# Patient Record
Sex: MALE | Race: BLACK OR AFRICAN AMERICAN
[De-identification: names, ages, dates, MRNs, and addresses within clinical notes are randomized per-mention and may not be internally consistent; named-entity substitution may affect disease eponyms.]

---

## 2018-03-20 ENCOUNTER — HOSPITAL ENCOUNTER (INPATIENT)
Dept: HOSPITAL 26 - MED | Age: 68
LOS: 3 days | Discharge: HOME HEALTH | DRG: 871 | End: 2018-03-23
Payer: COMMERCIAL

## 2018-03-20 VITALS — DIASTOLIC BLOOD PRESSURE: 101 MMHG | SYSTOLIC BLOOD PRESSURE: 125 MMHG

## 2018-03-20 VITALS — WEIGHT: 227.03 LBS | HEIGHT: 72 IN | BODY MASS INDEX: 30.75 KG/M2

## 2018-03-20 VITALS — SYSTOLIC BLOOD PRESSURE: 160 MMHG | DIASTOLIC BLOOD PRESSURE: 87 MMHG

## 2018-03-20 VITALS — SYSTOLIC BLOOD PRESSURE: 130 MMHG | DIASTOLIC BLOOD PRESSURE: 69 MMHG

## 2018-03-20 VITALS — DIASTOLIC BLOOD PRESSURE: 78 MMHG | SYSTOLIC BLOOD PRESSURE: 138 MMHG

## 2018-03-20 DIAGNOSIS — H91.90: ICD-10-CM

## 2018-03-20 DIAGNOSIS — N17.0: ICD-10-CM

## 2018-03-20 DIAGNOSIS — Z82.49: ICD-10-CM

## 2018-03-20 DIAGNOSIS — I10: ICD-10-CM

## 2018-03-20 DIAGNOSIS — G89.29: ICD-10-CM

## 2018-03-20 DIAGNOSIS — B19.20: ICD-10-CM

## 2018-03-20 DIAGNOSIS — E86.0: ICD-10-CM

## 2018-03-20 DIAGNOSIS — Z86.19: ICD-10-CM

## 2018-03-20 DIAGNOSIS — M54.9: ICD-10-CM

## 2018-03-20 DIAGNOSIS — F12.90: ICD-10-CM

## 2018-03-20 DIAGNOSIS — Z86.73: ICD-10-CM

## 2018-03-20 DIAGNOSIS — E03.9: ICD-10-CM

## 2018-03-20 DIAGNOSIS — T40.2X1A: ICD-10-CM

## 2018-03-20 DIAGNOSIS — E87.6: ICD-10-CM

## 2018-03-20 DIAGNOSIS — J69.0: ICD-10-CM

## 2018-03-20 DIAGNOSIS — E83.39: ICD-10-CM

## 2018-03-20 DIAGNOSIS — E11.9: ICD-10-CM

## 2018-03-20 DIAGNOSIS — N39.0: ICD-10-CM

## 2018-03-20 DIAGNOSIS — A41.9: Primary | ICD-10-CM
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
## 2018-03-20 DIAGNOSIS — F32.9: ICD-10-CM

## 2018-03-20 DIAGNOSIS — G92: ICD-10-CM

## 2018-03-20 DIAGNOSIS — F43.10: ICD-10-CM

## 2018-03-20 LAB
ALBUMIN FLD-MCNC: 3.6 G/DL (ref 3.4–5)
AMORPH SED URNS QL MICRO: (no result) /HPF
AMYLASE SERPL-CCNC: 120 U/L (ref 25–115)
ANION GAP SERPL CALCULATED.3IONS-SCNC: 14.9 MMOL/L (ref 8–16)
APAP SERPL-MCNC: < 0.5 UG/ML (ref 10–30)
APPEARANCE UR: (no result)
AST SERPL-CCNC: 18 U/L (ref 15–37)
BARBITURATES UR QL SCN: (no result) NG/ML
BASOPHILS # BLD AUTO: 0.1 K/UL (ref 0–0.22)
BASOPHILS NFR BLD AUTO: 0.8 % (ref 0–2)
BENZODIAZ UR QL SCN: (no result) NG/ML
BILIRUB SERPL-MCNC: 0.7 MG/DL (ref 0–1)
BILIRUB UR QL STRIP: NEGATIVE
BUN SERPL-MCNC: 24 MG/DL (ref 7–18)
BZE UR QL SCN: (no result) NG/ML
CANNABINOIDS UR QL SCN: (no result) NG/ML
CHLORIDE SERPL-SCNC: 105 MMOL/L (ref 98–107)
CHOLEST/HDLC SERPL: 3.6 {RATIO} (ref 1–4.5)
CO2 SERPL-SCNC: 23.9 MMOL/L (ref 21–32)
COLOR UR: YELLOW
CREAT SERPL-MCNC: 1.3 MG/DL (ref 0.7–1.3)
EOSINOPHIL # BLD AUTO: 0.1 K/UL (ref 0–0.4)
EOSINOPHIL NFR BLD AUTO: 0.4 % (ref 0–4)
ERYTHROCYTE [DISTWIDTH] IN BLOOD BY AUTOMATED COUNT: 13.5 % (ref 11.6–13.7)
GFR SERPL CREATININE-BSD FRML MDRD: 71 ML/MIN (ref 90–?)
GLUCOSE SERPL-MCNC: 132 MG/DL (ref 74–106)
GLUCOSE UR STRIP-MCNC: NEGATIVE MG/DL
HCT VFR BLD AUTO: 37 % (ref 36–52)
HDLC SERPL-MCNC: 49 MG/DL (ref 40–60)
HGB BLD-MCNC: 12.3 G/DL (ref 12–18)
HGB UR QL STRIP: NEGATIVE
LDLC SERPL CALC-MCNC: 106 MG/DL (ref 60–100)
LEUKOCYTE ESTERASE UR QL STRIP: NEGATIVE
LIPASE SERPL-CCNC: 79 U/L (ref 73–393)
LYMPHOCYTES # BLD AUTO: 0.5 K/UL (ref 2–11.5)
LYMPHOCYTES NFR BLD AUTO: 3.3 % (ref 20.5–51.1)
MCH RBC QN AUTO: 32 PG (ref 27–31)
MCHC RBC AUTO-ENTMCNC: 33 G/DL (ref 33–37)
MCV RBC AUTO: 96 FL (ref 80–94)
MONOCYTES # BLD AUTO: 0.1 K/UL (ref 0.8–1)
MONOCYTES NFR BLD AUTO: 0.8 % (ref 1.7–9.3)
NEUTROPHILS # BLD AUTO: 15.2 K/UL (ref 1.8–7.7)
NEUTROPHILS NFR BLD AUTO: 94.7 % (ref 42.2–75.2)
NITRITE UR QL STRIP: NEGATIVE
OPIATES UR QL SCN: (no result) NG/ML
PCP UR QL SCN: (no result) NG/ML
PH UR STRIP: 5.5 [PH] (ref 5–9)
PLATELET # BLD AUTO: 164 K/UL (ref 140–450)
POTASSIUM SERPL-SCNC: 3.8 MMOL/L (ref 3.5–5.1)
PROTHROMBIN TIME: 11.6 SECS (ref 10.8–13.4)
RBC # BLD AUTO: 3.88 MIL/UL (ref 4.2–6.1)
RBC #/AREA URNS HPF: (no result) /HPF (ref 0–5)
SALICYLATES SERPL-MCNC: < 2.8 MG/DL (ref 2.8–20)
SODIUM SERPL-SCNC: 140 MMOL/L (ref 136–145)
T4 FREE SERPL-MCNC: 0.71 NG/DL (ref 0.76–1.46)
TRIGL SERPL-MCNC: 106 MG/DL (ref 30–150)
TSH SERPL DL<=0.05 MIU/L-ACNC: 4.16 UIU/ML (ref 0.34–3.74)
WBC # BLD AUTO: 16 K/UL (ref 4.8–10.8)
WBC,URINE: (no result) /HPF (ref 0–5)

## 2018-03-20 PROCEDURE — G0480 DRUG TEST DEF 1-7 CLASSES: HCPCS

## 2018-03-20 PROCEDURE — C1758 CATHETER, URETERAL: HCPCS

## 2018-03-20 PROCEDURE — G0482 DRUG TEST DEF 15-21 CLASSES: HCPCS

## 2018-03-20 RX ADMIN — Medication SCH DEV: at 21:45

## 2018-03-20 RX ADMIN — SODIUM CHLORIDE SCH MLS/HR: 9 INJECTION, SOLUTION INTRAVENOUS at 11:40

## 2018-03-20 RX ADMIN — PIPERACILLIN SODIUM AND TAZOBACTAM SODIUM SCH MLS/HR: 2; .25 INJECTION, SOLUTION INTRAVENOUS at 20:48

## 2018-03-20 RX ADMIN — SIMVASTATIN SCH MG: 20 TABLET, FILM COATED ORAL at 20:47

## 2018-03-20 RX ADMIN — GABAPENTIN SCH MG: 300 CAPSULE ORAL at 13:40

## 2018-03-20 RX ADMIN — Medication SCH DEV: at 16:30

## 2018-03-20 RX ADMIN — GABAPENTIN SCH MG: 300 CAPSULE ORAL at 17:07

## 2018-03-20 RX ADMIN — SODIUM CHLORIDE SCH MLS/HR: 9 INJECTION, SOLUTION INTRAVENOUS at 18:06

## 2018-03-20 NOTE — NUR
SEEN PATIENT LYING COMFORTABLE IN BED WATCHING TV. BED IN LOW POSITION, CALL LIGHT WITHIN 
REACH, BED ALARM ON. NO S/S OF DISTRESS NOTED AT THIS TIME. WILL CONTINUE TO MONITOR.

## 2018-03-20 NOTE — NUR
Patient will be admitted to care of DR POWERS. Admited to TELE.  Will go to 
room 123B. Belongings list completed.  Report to LYNETTE MARTINEZ.

## 2018-03-20 NOTE — NUR
PATIENT PRESENTS TO ED WITH BIB [facility] WITH C/O OD ON NORCO, GIVEN NARCAN ON THE 
FIELD, RESPOND APPROPRIATELY, 

HX; DM, HTN, STROKE, HEARING IMPAIRED

RX; NORVASC, LOTENSIN --

DENIES N/V/D; SKIN IS PINK/WARM/DRY; AAOX4 WITH EVEN AND STEADY GAIT; LUNGS 
CLEAR BL; HR EVEN AND REGULAR; PT DENIES ANY FEVER, CP, SOB, OR COUGH AT THIS 
TIME; PATIENT STATES PAIN OF 0/10 AT THIS TIME; VSS; PATIENT POSITIONED FOR 
COMFORT; HOB ELEVATED; BEDRAILS UP X2; BED DOWN. ER MD MADE AWARE OF PT STATUS.

## 2018-03-20 NOTE — NUR
PT UP WITH PHYSICAL THERAPY. REPORTED PT GOT UP TO BATHROOM. ASSISTED PT BACK TO BED. NO 
SIGNS OF DISTRESS. PT ASKED TO CALL HIS WIFE. CALLED WIFE, SHE SAID SHE WILL COME AND VISIT 
TODAY. PT AWARE. WILL CONTINUE TO MONITOR.

## 2018-03-20 NOTE — NUR
RECEIVED PATIENT LYING COMFORTABLE IN BED. CALL LIGHTS WITHIN REACH, BED ALARM ON, BED IN 
LOW POSITION. DISCUSSED TO PATIENT PLAN OF CARE. WILL CONTINUE TO MONITOR.

## 2018-03-20 NOTE — NUR
DANNY FROM RADIOLOGY CALLED THAT PATIENT REFUSE FOR CT SCAN. NOTIFIED DR. JOHNSON AND HE WILL 
REVIEW PATIENT CHART.

## 2018-03-20 NOTE — NUR
PT ADMITTED TO [facility]. ARRIVED TO UNIT VIA GURNEY ACCOMPANIED BY RN AND TECH. BEDSIDE REPORT 
GIVEN BY ER NURSE. PT TRANSFERRED TO BED WITH TOTAL ASSIST. HAD INCONTINENT URINE. CHANGED 
LINENS AND GOWN. PT IS MUTE, NON VERBAL. AWAKE AND ORIENTED. PT ABLE TO FOLLOW COMMANDS. 
LUNG SOUNDS CLEAR ON AUSCULTATION. BLE EDEMA +2. SKIN WARM AND DRY. NO OPEN WOUNDS. APPLIED 
YELLOW GOWN, SOCKS, ID BAND AND SIGN POSTED.  VS: TEMP 99.9, O2 96% ON NC 2L, , RR 14, 
/85. BED IN LOW POSITION, WHEELS LOCKED, CALL LIGHT WITHIN REACH. WILL CONTINUE TO 
MONITOR.

## 2018-03-20 NOTE — NUR
PT WITH US TECH IN ROOM. PT USED URINAL AT BEDSIDE. PT ABLE TO SPEAK AT THIS TIME. ASKED IF 
PT WAS DONE USING URINAL. PT STATED "YES". NO SIGNS OF DISTRESS. WILL CONTINUE TO MONITOR.

## 2018-03-21 VITALS — DIASTOLIC BLOOD PRESSURE: 68 MMHG | SYSTOLIC BLOOD PRESSURE: 139 MMHG

## 2018-03-21 VITALS — SYSTOLIC BLOOD PRESSURE: 125 MMHG | DIASTOLIC BLOOD PRESSURE: 77 MMHG

## 2018-03-21 VITALS — DIASTOLIC BLOOD PRESSURE: 90 MMHG | SYSTOLIC BLOOD PRESSURE: 140 MMHG

## 2018-03-21 VITALS — SYSTOLIC BLOOD PRESSURE: 147 MMHG | DIASTOLIC BLOOD PRESSURE: 87 MMHG

## 2018-03-21 VITALS — DIASTOLIC BLOOD PRESSURE: 75 MMHG | SYSTOLIC BLOOD PRESSURE: 135 MMHG

## 2018-03-21 LAB
ANION GAP SERPL CALCULATED.3IONS-SCNC: 11.4 MMOL/L (ref 8–16)
BUN SERPL-MCNC: 19 MG/DL (ref 7–18)
CHLORIDE SERPL-SCNC: 109 MMOL/L (ref 98–107)
CO2 SERPL-SCNC: 24.9 MMOL/L (ref 21–32)
CREAT SERPL-MCNC: 1.1 MG/DL (ref 0.7–1.3)
EOSINOPHIL NFR BLD MANUAL: 1 % (ref 0–4)
ERYTHROCYTE [DISTWIDTH] IN BLOOD BY AUTOMATED COUNT: 13.6 % (ref 11.6–13.7)
GFR SERPL CREATININE-BSD FRML MDRD: 86 ML/MIN (ref 90–?)
GLUCOSE SERPL-MCNC: 102 MG/DL (ref 74–106)
HCT VFR BLD AUTO: 34 % (ref 36–52)
HGB BLD-MCNC: 11.5 G/DL (ref 12–18)
LYMPHOCYTES NFR BLD MANUAL: 20 % (ref 20–46)
MAGNESIUM SERPL-MCNC: 1.9 MG/DL (ref 1.8–2.4)
MCH RBC QN AUTO: 32 PG (ref 27–31)
MCHC RBC AUTO-ENTMCNC: 34 G/DL (ref 33–37)
MCV RBC AUTO: 96 FL (ref 80–94)
MONOCYTES NFR BLD MANUAL: 3 % (ref 5–12)
PHOSPHATE SERPL-MCNC: 1.8 MG/DL (ref 2.5–4.9)
PLATELET # BLD AUTO: 153 K/UL (ref 140–450)
POTASSIUM SERPL-SCNC: 3.3 MMOL/L (ref 3.5–5.1)
RBC # BLD AUTO: 3.54 MIL/UL (ref 4.2–6.1)
SODIUM SERPL-SCNC: 142 MMOL/L (ref 136–145)
T3RU NFR SERPL: 26 % (ref 24–39)
WBC # BLD AUTO: 18.6 K/UL (ref 4.8–10.8)

## 2018-03-21 RX ADMIN — GABAPENTIN SCH MG: 300 CAPSULE ORAL at 08:16

## 2018-03-21 RX ADMIN — BENAZEPRIL HYDROCHLORIDE SCH MG: 20 TABLET, FILM COATED ORAL at 08:16

## 2018-03-21 RX ADMIN — POTASSIUM & SODIUM PHOSPHATES POWDER PACK 280-160-250 MG SCH PKT: 280-160-250 PACK at 13:38

## 2018-03-21 RX ADMIN — PANTOPRAZOLE SODIUM SCH MG: 40 TABLET, DELAYED RELEASE ORAL at 08:16

## 2018-03-21 RX ADMIN — SODIUM CHLORIDE SCH MLS/HR: 9 INJECTION, SOLUTION INTRAVENOUS at 01:02

## 2018-03-21 RX ADMIN — Medication SCH DEV: at 08:13

## 2018-03-21 RX ADMIN — SIMVASTATIN SCH MG: 20 TABLET, FILM COATED ORAL at 20:34

## 2018-03-21 RX ADMIN — SODIUM CHLORIDE SCH MLS/HR: 9 INJECTION, SOLUTION INTRAVENOUS at 22:42

## 2018-03-21 RX ADMIN — PIPERACILLIN SODIUM AND TAZOBACTAM SODIUM SCH MLS/HR: 2; .25 INJECTION, SOLUTION INTRAVENOUS at 20:35

## 2018-03-21 RX ADMIN — GABAPENTIN SCH MG: 300 CAPSULE ORAL at 13:38

## 2018-03-21 RX ADMIN — PIPERACILLIN SODIUM AND TAZOBACTAM SODIUM SCH MLS/HR: 2; .25 INJECTION, SOLUTION INTRAVENOUS at 04:49

## 2018-03-21 RX ADMIN — PIPERACILLIN SODIUM AND TAZOBACTAM SODIUM SCH MLS/HR: 2; .25 INJECTION, SOLUTION INTRAVENOUS at 13:38

## 2018-03-21 RX ADMIN — Medication SCH DEV: at 20:38

## 2018-03-21 RX ADMIN — Medication SCH EACH: at 08:17

## 2018-03-21 RX ADMIN — GABAPENTIN SCH MG: 300 CAPSULE ORAL at 18:17

## 2018-03-21 RX ADMIN — Medication SCH DEV: at 11:30

## 2018-03-21 RX ADMIN — Medication SCH DEV: at 16:30

## 2018-03-21 RX ADMIN — POTASSIUM & SODIUM PHOSPHATES POWDER PACK 280-160-250 MG SCH PKT: 280-160-250 PACK at 18:17

## 2018-03-21 RX ADMIN — SODIUM CHLORIDE SCH MLS/HR: 9 INJECTION, SOLUTION INTRAVENOUS at 09:25

## 2018-03-21 RX ADMIN — DOCUSATE SODIUM SCH MG: 50 LIQUID ORAL at 08:17

## 2018-03-21 RX ADMIN — SODIUM CHLORIDE SCH MLS/HR: 9 INJECTION, SOLUTION INTRAVENOUS at 15:33

## 2018-03-21 NOTE — NUR
PT HAD LEAKED CONDOM CATH. CHANGED CONDOM CATH AND SECUREMENT. CHANGE WET LINENS AND GOWN. 
PT IN NO DISTRESS. WILL CONTINUE TO MONITOR.

## 2018-03-21 NOTE — NUR
FOUND PT HAD LEAKING CONDOM CATHETER. CHANGED SOILED LINENS AND GOWN. REPLACED NEW CONDOM 
CATHETER. PT TOLERATED WELL. RESTING IN BED NOW. NO SIGNS OF DISTRESS.

## 2018-03-21 NOTE — NUR
RECEIVED REPORT FROM DAY RN. PT RESTING IN BED. AAOX4. NO S/S OF ACUTE DISTRESS. PT DENIES 
PAIN. IV SITE PATENT AND INTACT. CALL LIGHT WITHIN REACH. SAFETY MEASURES ENSURED. WILL 
CONTINUE TO MONITOR.

## 2018-03-21 NOTE — NUR
PATIENT REQUESTED A CONDOM CATH. NOTIFIED DR. JOHNSON. MD PLACED ORDER AND CARRIED OUT.CONDOM 
CATH DRAINING YELLOW URINE. FALL PRECAUTION IMPLEMENTED. ALL NEEDS ATTENDED. WILL CONTINUE 
TO MONITOR.

## 2018-03-21 NOTE — NUR
RECEIVED REPORT FROM NIGHT SHIFT NURSE SUDHA AT BEDSIDE FOR CONTINUITY OF CARE. PT IS 
AWAKE AND ORIENTED X3. INTRODUCED SELF AND UPDATED BOARD. LUNG SOUNDS CLEAR ON AUSCULTATION. 
ON RA. O2 SAT 98%. NO COUGH PRESENT. SKIN IS WARM AND DRY. IV TO L FA 20G INTACT. NS 
@140ML/HR. CONDOM CATHETER IN PLACE. STRAW COLORED URINE NOTED. PT DENIES PAIN. BED IN LOW 
POSITION, WHEELS LOCKED, CALL LIGHT WITHIN REACH. WILL CONTINUE TO MONITOR.

## 2018-03-21 NOTE — NUR
PATIENT  SEEN LYING IN BED AWAKE WATCHING TV. CALL LIGHT WITHIN REACH, BED IN LOW POSITION. 
WILL CONTINUE TO MONITOR.

## 2018-03-21 NOTE — NUR
PATIENT HAS BEEN SCREENED AND CATEGORIZED AS MODERATE NUTRITION RISK. PATIENT WILL BE SEEN 
WITHIN 3-5 DAYS OF ADMISSION.



3/22/18 - 3/24/18



JOSE MARTIN [address]AVE[address]

## 2018-03-21 NOTE — NUR
ADMINISTERED MEDS. WROTE DOWN LIST OF MEDS FOR PT TO READ.  PT VERBALIZED UNDERSTANDING. 
TOLERATED WELL. CALL LIGHT WITHIN REACH. WILL CONTINUE TO MONITOR.

## 2018-03-21 NOTE — NUR
ENDORSED PT TO NIGHT SHIFT NURSE GEORGES AT BEDSIDE FOR CONTINUITY OF CARE. PT IN STABLE 
CONDITION.

## 2018-03-21 NOTE — NUR
AM  CARE DONE. LINEN CHANGE/BLANKET. PATIENT AWAKE WATCHING TV. SCD IN PLACED TO BLE. FALL 
PRECAUTION IMPLEMENTED. WILL CONTINUE TO MONITOR.

## 2018-03-21 NOTE — NUR
ADMINISTERED SCHEDULED MEDS. WROTE DOWN LIST OF MEDICATIONS FOR PT TO READ. PT VERBALIZED 
UNDERSTANDING AND TOLERATED MEDS WELL. ASSISTED WITH SET UP OF BREAKFAST. EATING FOOD 
INDEPENDENTLY. NO COMPLAINTS AT THIS TIME. BED IN LOW POSITION, BED ALARM ON, CALL LIGHT 
WITHIN REACH. WILL CONTINUE TO MONITOR.

## 2018-03-22 VITALS — SYSTOLIC BLOOD PRESSURE: 134 MMHG | DIASTOLIC BLOOD PRESSURE: 77 MMHG

## 2018-03-22 VITALS — SYSTOLIC BLOOD PRESSURE: 147 MMHG | DIASTOLIC BLOOD PRESSURE: 92 MMHG

## 2018-03-22 VITALS — DIASTOLIC BLOOD PRESSURE: 86 MMHG | SYSTOLIC BLOOD PRESSURE: 150 MMHG

## 2018-03-22 LAB
ANION GAP SERPL CALCULATED.3IONS-SCNC: 12.6 MMOL/L (ref 8–16)
BASOPHILS # BLD AUTO: 0.3 K/UL (ref 0–0.22)
BASOPHILS NFR BLD AUTO: 1.8 % (ref 0–2)
BUN SERPL-MCNC: 14 MG/DL (ref 7–18)
CHLORIDE SERPL-SCNC: 108 MMOL/L (ref 98–107)
CO2 SERPL-SCNC: 25 MMOL/L (ref 21–32)
CREAT SERPL-MCNC: 1.1 MG/DL (ref 0.7–1.3)
EOSINOPHIL # BLD AUTO: 0.3 K/UL (ref 0–0.4)
EOSINOPHIL NFR BLD AUTO: 1.8 % (ref 0–4)
ERYTHROCYTE [DISTWIDTH] IN BLOOD BY AUTOMATED COUNT: 13.6 % (ref 11.6–13.7)
GFR SERPL CREATININE-BSD FRML MDRD: 86 ML/MIN (ref 90–?)
GLUCOSE SERPL-MCNC: 97 MG/DL (ref 74–106)
HCT VFR BLD AUTO: 36.1 % (ref 36–52)
HGB BLD-MCNC: 12.1 G/DL (ref 12–18)
LYMPHOCYTES # BLD AUTO: 2.9 K/UL (ref 2–11.5)
LYMPHOCYTES NFR BLD AUTO: 21 % (ref 20.5–51.1)
MCH RBC QN AUTO: 32 PG (ref 27–31)
MCHC RBC AUTO-ENTMCNC: 34 G/DL (ref 33–37)
MCV RBC AUTO: 95 FL (ref 80–94)
MONOCYTES # BLD AUTO: 0.6 K/UL (ref 0.8–1)
MONOCYTES NFR BLD AUTO: 4.2 % (ref 1.7–9.3)
NEUTROPHILS # BLD AUTO: 9.9 K/UL (ref 1.8–7.7)
NEUTROPHILS NFR BLD AUTO: 71.2 % (ref 42.2–75.2)
PLATELET # BLD AUTO: 141 K/UL (ref 140–450)
POTASSIUM SERPL-SCNC: 3.6 MMOL/L (ref 3.5–5.1)
RBC # BLD AUTO: 3.8 MIL/UL (ref 4.2–6.1)
SODIUM SERPL-SCNC: 142 MMOL/L (ref 136–145)
WBC # BLD AUTO: 14 K/UL (ref 4.8–10.8)

## 2018-03-22 RX ADMIN — BENAZEPRIL HYDROCHLORIDE SCH MG: 20 TABLET, FILM COATED ORAL at 09:37

## 2018-03-22 RX ADMIN — GABAPENTIN SCH MG: 300 CAPSULE ORAL at 09:37

## 2018-03-22 RX ADMIN — DOCUSATE SODIUM SCH MG: 50 LIQUID ORAL at 09:36

## 2018-03-22 RX ADMIN — GABAPENTIN SCH MG: 300 CAPSULE ORAL at 12:11

## 2018-03-22 RX ADMIN — SIMVASTATIN SCH MG: 20 TABLET, FILM COATED ORAL at 20:33

## 2018-03-22 RX ADMIN — Medication SCH DEV: at 05:26

## 2018-03-22 RX ADMIN — Medication SCH DEV: at 20:39

## 2018-03-22 RX ADMIN — ACETAMINOPHEN PRN MG: 325 TABLET ORAL at 16:35

## 2018-03-22 RX ADMIN — Medication SCH DEV: at 16:35

## 2018-03-22 RX ADMIN — POTASSIUM & SODIUM PHOSPHATES POWDER PACK 280-160-250 MG SCH PKT: 280-160-250 PACK at 12:11

## 2018-03-22 RX ADMIN — ACETAMINOPHEN PRN MG: 325 TABLET ORAL at 20:43

## 2018-03-22 RX ADMIN — SODIUM CHLORIDE SCH MLS/HR: 9 INJECTION, SOLUTION INTRAVENOUS at 05:30

## 2018-03-22 RX ADMIN — POTASSIUM & SODIUM PHOSPHATES POWDER PACK 280-160-250 MG SCH PKT: 280-160-250 PACK at 09:36

## 2018-03-22 RX ADMIN — PIPERACILLIN SODIUM AND TAZOBACTAM SODIUM SCH MLS/HR: 2; .25 INJECTION, SOLUTION INTRAVENOUS at 05:30

## 2018-03-22 RX ADMIN — Medication SCH EACH: at 09:36

## 2018-03-22 RX ADMIN — POTASSIUM & SODIUM PHOSPHATES POWDER PACK 280-160-250 MG SCH PKT: 280-160-250 PACK at 16:35

## 2018-03-22 RX ADMIN — PANTOPRAZOLE SODIUM SCH MG: 40 TABLET, DELAYED RELEASE ORAL at 09:37

## 2018-03-22 RX ADMIN — SODIUM CHLORIDE SCH MLS/HR: 9 INJECTION, SOLUTION INTRAVENOUS at 12:24

## 2018-03-22 RX ADMIN — Medication SCH DEV: at 12:10

## 2018-03-22 RX ADMIN — GABAPENTIN SCH MG: 300 CAPSULE ORAL at 16:34

## 2018-03-22 RX ADMIN — PIPERACILLIN SODIUM AND TAZOBACTAM SODIUM SCH MLS/HR: 2; .25 INJECTION, SOLUTION INTRAVENOUS at 12:11

## 2018-03-22 RX ADMIN — PIPERACILLIN SODIUM AND TAZOBACTAM SODIUM SCH MLS/HR: 2; .25 INJECTION, SOLUTION INTRAVENOUS at 20:33

## 2018-03-22 NOTE — NUR
PT TOLERATED MEDICATIONS WELL. NO S/S OF RESPIRATORY DISTRESS OR DISCOMFORT NOTED AT THIS 
TIME. BED IN LOWEST POSITION. CALL LIGHT WITHIN REACH. WILL CONTINUE TO MONITOR.

## 2018-03-22 NOTE — NUR
CM NOTE

RECEIVED ORDER FOR HOME HEALTH FOR PT.  PER DR. PRASAD, PLAN IS TO DC PATIENT WITH HOME 
HEALTH TOMORROW.  PER TJ DE LOS SANTOS, SHE SPOKE WITH PATIENT'S WIFE WHO IS AGREEABLE TO HOME 
HEALTH AND PREFERS [facility].  FAXED INQUIRY TO [facility] [phone].  PER BRENDA MONDRAGON 
OF [facility]# [phone] THEY ARE ACCEPTING THE PATIENT, THEY HAVE A NURSE/PT TO 
SEE PATIENT WHEN DISCHARGED AND THEY ARE AWARE OF THE PLAN TO DISCHARGE PATIENT TOMORROW.

## 2018-03-22 NOTE — NUR
PATIENT REPORT GIVEN TO NIGHT SHIFT RN AT BEDSIDE FOR CONTINUITY OF CARE. PATIENT IN STABLE 
CONDITION.

## 2018-03-22 NOTE — NUR
RECEIVED PT FROM DAY SHIFT NURSE KY-RN. AOX3, HARD OF HEARING AND REQUIRES USING NOTEPAD TO 
COMMUNICATE, ALSO USES GLASSES. AMBULATORY WITH ASSISTANCE. ON ROOM AIR. BLE EDEMA, SCD ON. 
NO LONGER HAS CONDOM CATHETER. IS ABLE TO USE URINAL AT BEDSIDE. NO S/S OF RESPIRATORY 
DISTRESS OR DISCOMFORT NOTED AT THIS TIME. BED IN LOWEST POSITION. CALL LIGHT WITHIN REACH. 
WILL CONTINUE TO MONITOR.

## 2018-03-22 NOTE — NUR
ADMINISTERED ORDERED MEDICATIONS. PATIENT TOLERATED IT WELL. BLOOD SUGAR 81, NO COVERAGE 
GIVEN. PATIENT C/O BACK PAIN 3/10, TYLENOL PRN GIVEN. PATIENT TOLERATED IT WELL. NO SIGNS OF 
DISTRESS OR SOB NOTED. SAFETY PRECAUTION IN PLACE, CALL LIGHT WITHIN REACH, WILL CONTINUE TO 
MONITOR PATIENT.

## 2018-03-22 NOTE — NUR
Social Workers notes: 

I contact Patient's wife Kayleigh Gomes at [phone], discuss and confirm Patient's 
information gather during screen with Patient.  Per wife Patient has provided correct 
information and she added that Patient's health has declined and his needed help increased 
over the last couple years.  Per Mrs. Gomes she monitor his medications daily and patient is 
in constant supervision due to his lack of impulse control she has to watch him to not have 
him hurt self or fall when he attempts to get up or walk by himself.  Per Mrs. Gomes she 
recently got a safe box to lock all medications away from patient and take better safety 
measures since he tends to take medications due to been in constant pain.  During 
conversation with Mrs. Gomes these writer provided her with patient's status and MD. Falcon 
recommendations for Home health services for Physical Therapy after he is discharge form 
[facility].  Mrs. Gomes agreed and inquired about getting services with [facility]. I 
informed Mrs. Gomes that I will let  know of her request.  She agreed, thank me 
for my assistance and I ended the call.

## 2018-03-22 NOTE — NUR
RECEIVED REPORT FROM NIGHT SHIFT NURSE AT BEDSIDE FOR CONTINUITY OF CARE. PT RESTING IN BED. 
AAOX4. NO S/S OF ACUTE DISTRESS. PT DENIES PAIN. IV SITE PATENT AND INTACT, IVF INFUSING 
WELL. SAFETY PRECAUTION IN PLACE, CALL LIGHT WITHIN REACH. SAFETY PRECAUTION IN PLACE. WILL 
CONTINUE TO MONITOR PATIENT.

## 2018-03-22 NOTE — NUR
ADMINISTERED ORDERED MEDICATIONS. PATIENT TOLERATED THEM WELL. PATIENT DENIES PAIN. NO SIGNS 
OF DISTRESS OR SOB NOTED. SAFETY PRECAUTION IN PLACE, CALL LIGHT WITHIN REACH, WILL CONTINUE 
TO MONITOR PATIENT.

## 2018-03-22 NOTE — NUR
PATIENT AMBULATED TO BATHROOM WITH ASSISTANCE. PATIENT HAD BOWEL MOVEMENT. PATIENT NOW 
RESTING IN BED, PATIENT DENIES PAIN. NO SIGNS OF DISTRESS OR SOB NOTED. SAFETY PRECAUTION IN 
PLACE, CALL LIGHT WITHIN REACH, WILL CONTINUE TO MONITOR PATIENT.

## 2018-03-22 NOTE — NUR
L AC IV INFILTRATED. IV REMOVED, IV CATHETER INTACT. MINIMAL BLEEDING NOTED. NEW IV INSERTED 
IN R FA 22G, PATENT, ASYMPTOMATIC, AND INTACT. PATIENT TOLERATED PROCESS WELL. SAFETY 
PRECAUTION IN PLACE, BED ALARM ON, CALL LIGHT WITHIN REACH, WILL CONTINUE TO MONITOR 
PATIENT.

## 2018-03-22 NOTE — NUR
ADMINISTERED ORDERED MEDICATIONS. PATIENT TOLERATED IT WELL. BLOOD SUGAR 84, NO COVERAGE 
GIVEN. PATIENT DENIES PAIN. NO SIGNS OF DISTRESS OR SOB NOTED. SAFETY PRECAUTION IN PLACE, 
CALL LIGHT WITHIN REACH, WILL CONTINUE TO MONITOR PATIENT.

## 2018-03-23 VITALS — DIASTOLIC BLOOD PRESSURE: 85 MMHG | SYSTOLIC BLOOD PRESSURE: 129 MMHG

## 2018-03-23 VITALS — SYSTOLIC BLOOD PRESSURE: 143 MMHG | DIASTOLIC BLOOD PRESSURE: 89 MMHG

## 2018-03-23 LAB
ANION GAP SERPL CALCULATED.3IONS-SCNC: 13.8 MMOL/L (ref 8–16)
BASOPHILS # BLD AUTO: 0.3 K/UL (ref 0–0.22)
BASOPHILS NFR BLD AUTO: 2.8 % (ref 0–2)
BUN SERPL-MCNC: 17 MG/DL (ref 7–18)
CHLORIDE SERPL-SCNC: 108 MMOL/L (ref 98–107)
CO2 SERPL-SCNC: 24.6 MMOL/L (ref 21–32)
CREAT SERPL-MCNC: 1.2 MG/DL (ref 0.7–1.3)
EOSINOPHIL # BLD AUTO: 0.3 K/UL (ref 0–0.4)
EOSINOPHIL NFR BLD AUTO: 2.6 % (ref 0–4)
ERYTHROCYTE [DISTWIDTH] IN BLOOD BY AUTOMATED COUNT: 13.4 % (ref 11.6–13.7)
GFR SERPL CREATININE-BSD FRML MDRD: 78 ML/MIN (ref 90–?)
GLUCOSE SERPL-MCNC: 97 MG/DL (ref 74–106)
HCT VFR BLD AUTO: 35.3 % (ref 36–52)
HGB BLD-MCNC: 11.7 G/DL (ref 12–18)
LYMPHOCYTES # BLD AUTO: 2.9 K/UL (ref 2–11.5)
LYMPHOCYTES NFR BLD AUTO: 27.5 % (ref 20.5–51.1)
MCH RBC QN AUTO: 32 PG (ref 27–31)
MCHC RBC AUTO-ENTMCNC: 33 G/DL (ref 33–37)
MCV RBC AUTO: 95.8 FL (ref 80–94)
MONOCYTES # BLD AUTO: 0.4 K/UL (ref 0.8–1)
MONOCYTES NFR BLD AUTO: 4.2 % (ref 1.7–9.3)
NEUTROPHILS # BLD AUTO: 6.6 K/UL (ref 1.8–7.7)
NEUTROPHILS NFR BLD AUTO: 62.9 % (ref 42.2–75.2)
PLATELET # BLD AUTO: 164 K/UL (ref 140–450)
POTASSIUM SERPL-SCNC: 3.4 MMOL/L (ref 3.5–5.1)
RBC # BLD AUTO: 3.68 MIL/UL (ref 4.2–6.1)
SODIUM SERPL-SCNC: 143 MMOL/L (ref 136–145)
WBC # BLD AUTO: 10.5 K/UL (ref 4.8–10.8)

## 2018-03-23 RX ADMIN — PANTOPRAZOLE SODIUM SCH MG: 40 TABLET, DELAYED RELEASE ORAL at 09:22

## 2018-03-23 RX ADMIN — Medication SCH DEV: at 07:40

## 2018-03-23 RX ADMIN — DOCUSATE SODIUM SCH MG: 50 LIQUID ORAL at 09:22

## 2018-03-23 RX ADMIN — GABAPENTIN SCH MG: 300 CAPSULE ORAL at 09:23

## 2018-03-23 RX ADMIN — PIPERACILLIN SODIUM AND TAZOBACTAM SODIUM SCH MLS/HR: 2; .25 INJECTION, SOLUTION INTRAVENOUS at 05:46

## 2018-03-23 RX ADMIN — Medication SCH EACH: at 09:22

## 2018-03-23 RX ADMIN — POTASSIUM & SODIUM PHOSPHATES POWDER PACK 280-160-250 MG SCH PKT: 280-160-250 PACK at 09:22

## 2018-03-23 RX ADMIN — Medication SCH DEV: at 11:30

## 2018-03-23 RX ADMIN — BENAZEPRIL HYDROCHLORIDE SCH MG: 20 TABLET, FILM COATED ORAL at 09:23

## 2018-03-23 NOTE — NUR
PATIENT AMBULATED TO RESTROOM WITH STANDBY ASSIST. PHYSICAL THERAPY ALSO IN TO SEE PATIENT. 
WILL WAIT FOR THEIR ASSESSMENT AND EVALUATION.

## 2018-03-23 NOTE — NUR
ASSISTED PT TO CHAIR SO THAT BED LINENS COULD BE CHANGED. THEY BECAME WET WITH URINE WHILE 
USING THE URINAL. NO S/S OF RESPIRATORY DISTRESS OR DISCOMFORT NOTED AT THIS TIME. BED IN 
LOWEST POSITION. CALL LIGHT WITHIN REACH. WILL CONTINUE TO MONITOR.

## 2018-03-23 NOTE — NUR
SPOKE TO DR. PRASAD ABOUT PATIENT'S POSSIBLE DISCHARGE. DR. PRASAD WORKING ON DISCHARGE 
ORDER, SAID PATIENT WILL BE DISCHARGED TODAY. WIFE BIANCA CALLED [phone]. SPOKE TO 
PT'S WIFE TO INFORM HIM OF PATIENT'S DISCHARGE, SHE SAID TO CALL BACK WHEN THE ORDERS AND 
PAPERWORK ARE DONE, THEN SHE WILL COME TO PICK HIM UP. INFORMED PATIENT OF THIS NEWS. 
PATIENT RESTING IN BED, NO SIGNS OF DISTRESS OR SOB NOTED, SAFETY PRECAUTION IN PLACE, CALL 
LIGHT WITHIN REACH. WILL CONTINUE TO MONITOR PATIENT.

## 2018-03-23 NOTE — NUR
ADMINISTERED ORDERED MEDICATIONS. PATIENT TOLERATED THEM WELL. PATIENT DENIES PAIN. NO SIGNS 
OF DISTRESS OR SOB NOTED. PATIENT ASKED WHEN HE WILL BE DISCHARGED TODAY. INFORMED HIM I 
WILL SPEAK TO THE DOCTORS AND FORWARD HIS QUESTIONS AND GET BACK TO HIM. SAFETY PRECAUTION 
IN PLACE, CALL LIGHT WITHIN REACH, WILL CONTINUE TO MONITOR PATIENT.

## 2018-03-23 NOTE — NUR
CM NOTE

PATIENT'S HOME HEALTH IS SET UP WITH [facility] # [phone] AND BRENDA MONDRAGON OF [facility] STATED THAT THEY ARE ABLE TO SEE PATIENT IN THE NEXT 48 HRS POST DISCHARGE.  [place] RN AWARE.

## 2018-03-23 NOTE — NUR
PATIENT WHEELED OFF FLOOR ACCOMPANIED BY RN TO LOBBY WHERE HIS WIFE AND GRANDDAUGHTER WERE. 
PATIENT TOOK ALL HIS BELONGINGS WITH HIM. PATIENT IN STABLE CONDITION.

## 2018-03-23 NOTE — NUR
DISCHARGE INSTRUCTION AND EDUCATION GIVEN TO PATIENT. PATIENT VERBALIZED UNDERSTANDING. IV 
REMOVED, IV CATHETER INTACT, MINIMAL BLOOD NOTED. BLOOD SUGAR 70, ENCOURAGED PATIENT TO EAT 
HIS LUNCH WHILE WAITING FOR HIS WIFE TO ARRIVE. AFTER EATING LUNCH, PATIENT WILL GET DRESSED 
AND GET READY TO BE DISCHARGE OFF THE FLOOR.

## 2018-03-23 NOTE — NUR
PT SLEEPING IN BED AT THIS TIME. NO S/S OF RESPIRATORY DISTRESS OR DISCOMFORT NOTED AT THIS 
TIME. BED IN LOWEST POSITION. CALL LIGHT WITHIN REACH. WILL CONTINUE TO MONITOR.

## 2018-03-23 NOTE — NUR
PATIENT FINISHED LUNCH, WIFE FROM [facility] CALLED, SAYING THAT SHE COULD NOT ENTER BECAUSE SHE 
HAD HER 2 YEAR OLD GRANDDAUGHTER WITH HER. SHE BROUGHT THE PATIENT'S CLOTHING. CLOTHING 
GIVEN TO PATIENT TO CHANGE INTO. PATIENT AMBULATED TO BATHROOM WITH STANDBY ASSIST. NOW IN 
THE PROCESS OF CHANGING CLOTHES TO BE DISCHARGED. BLOOD SUGAR AFTER LUNCH 101, NO COVERAGE 
NEEDED.

## 2018-03-23 NOTE — NUR
RECEIVED REPORT FROM NIGHT SHIFT NURSE AT BEDSIDE FOR CONTINUITY OF CARE. PT RESTING IN BED. 
AAOX4. NO S/S OF ACUTE DISTRESS. PT DENIES PAIN. IV SITE PATENT AND INTACT, SALINE LOCKED. 
VITAL SIGNS WNL. SAFETY PRECAUTION IN PLACE, CALL LIGHT WITHIN REACH. WILL CONTINUE TO 
MONITOR PATIENT.

## 2018-03-23 NOTE — NUR
PT SLEEPING AT THIS TIME. NO S/S OF RESPIRATORY DISTRESS OR DISCOMFORT NOTED AT THIS TIME. 
BED IN LOWEST POSITION. CALL LIGHT WITHIN REACH. WILL CONTINUE TO MONITOR.

## 2018-05-03 ENCOUNTER — HOSPITAL ENCOUNTER (INPATIENT)
Dept: HOSPITAL 26 - MED | Age: 68
LOS: 3 days | Discharge: HOME | DRG: 871 | End: 2018-05-06
Attending: FAMILY MEDICINE | Admitting: FAMILY MEDICINE
Payer: COMMERCIAL

## 2018-05-03 VITALS — SYSTOLIC BLOOD PRESSURE: 154 MMHG | DIASTOLIC BLOOD PRESSURE: 91 MMHG

## 2018-05-03 VITALS — DIASTOLIC BLOOD PRESSURE: 109 MMHG | SYSTOLIC BLOOD PRESSURE: 172 MMHG

## 2018-05-03 VITALS — WEIGHT: 219.04 LBS | BODY MASS INDEX: 33.2 KG/M2 | HEIGHT: 68 IN

## 2018-05-03 DIAGNOSIS — Z80.42: ICD-10-CM

## 2018-05-03 DIAGNOSIS — H91.93: ICD-10-CM

## 2018-05-03 DIAGNOSIS — E44.0: ICD-10-CM
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
## 2018-05-03 DIAGNOSIS — D63.8: ICD-10-CM

## 2018-05-03 DIAGNOSIS — E11.9: ICD-10-CM

## 2018-05-03 DIAGNOSIS — F41.9: ICD-10-CM

## 2018-05-03 DIAGNOSIS — S09.90XA: ICD-10-CM

## 2018-05-03 DIAGNOSIS — J69.0: ICD-10-CM

## 2018-05-03 DIAGNOSIS — J96.01: ICD-10-CM

## 2018-05-03 DIAGNOSIS — Z86.73: ICD-10-CM

## 2018-05-03 DIAGNOSIS — I10: ICD-10-CM

## 2018-05-03 DIAGNOSIS — K74.60: ICD-10-CM

## 2018-05-03 DIAGNOSIS — A41.9: Primary | ICD-10-CM

## 2018-05-03 DIAGNOSIS — F12.90: ICD-10-CM
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
## 2018-05-03 DIAGNOSIS — E87.6: ICD-10-CM
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
## 2018-05-03 DIAGNOSIS — D69.6: ICD-10-CM

## 2018-05-03 DIAGNOSIS — Z82.49: ICD-10-CM

## 2018-05-03 DIAGNOSIS — F32.9: ICD-10-CM

## 2018-05-03 DIAGNOSIS — Z79.899: ICD-10-CM

## 2018-05-03 DIAGNOSIS — E66.01: ICD-10-CM

## 2018-05-03 DIAGNOSIS — E83.42: ICD-10-CM

## 2018-05-03 DIAGNOSIS — B19.20: ICD-10-CM

## 2018-05-03 LAB
ALBUMIN FLD-MCNC: 3.3 G/DL (ref 3.4–5)
AMYLASE SERPL-CCNC: 168 U/L (ref 25–115)
ANION GAP SERPL CALCULATED.3IONS-SCNC: 13.9 MMOL/L (ref 8–16)
APPEARANCE UR: CLEAR
AST SERPL-CCNC: 20 U/L (ref 15–37)
BASOPHILS # BLD AUTO: 0 K/UL (ref 0–0.22)
BASOPHILS NFR BLD AUTO: 0.1 % (ref 0–2)
BILIRUB SERPL-MCNC: 1.4 MG/DL (ref 0–1)
BILIRUB UR QL STRIP: NEGATIVE
BUN SERPL-MCNC: 13 MG/DL (ref 7–18)
CHLORIDE SERPL-SCNC: 106 MMOL/L (ref 98–107)
CHOLEST/HDLC SERPL: 2.3 {RATIO} (ref 1–4.5)
CO2 SERPL-SCNC: 25.3 MMOL/L (ref 21–32)
COLOR UR: YELLOW
CREAT SERPL-MCNC: 1.2 MG/DL (ref 0.7–1.3)
EOSINOPHIL # BLD AUTO: 0 K/UL (ref 0–0.4)
EOSINOPHIL NFR BLD AUTO: 0.1 % (ref 0–4)
ERYTHROCYTE [DISTWIDTH] IN BLOOD BY AUTOMATED COUNT: 13.6 % (ref 11.6–13.7)
GFR SERPL CREATININE-BSD FRML MDRD: 78 ML/MIN (ref 90–?)
GLUCOSE SERPL-MCNC: 131 MG/DL (ref 74–106)
GLUCOSE UR STRIP-MCNC: NEGATIVE MG/DL
HCT VFR BLD AUTO: 34.3 % (ref 36–52)
HDLC SERPL-MCNC: 56 MG/DL (ref 40–60)
HGB BLD-MCNC: 11.4 G/DL (ref 12–18)
HGB UR QL STRIP: NEGATIVE
IRON SERPL-MCNC: 17 UG/DL (ref 35–150)
LDLC SERPL CALC-MCNC: 59 MG/DL (ref 60–100)
LEUKOCYTE ESTERASE UR QL STRIP: NEGATIVE
LIPASE SERPL-CCNC: 79 U/L (ref 73–393)
LYMPHOCYTES # BLD AUTO: 1.4 K/UL (ref 2–11.5)
LYMPHOCYTES NFR BLD AUTO: 11.5 % (ref 20.5–51.1)
MAGNESIUM SERPL-MCNC: 1.5 MG/DL (ref 1.8–2.4)
MCH RBC QN AUTO: 31 PG (ref 27–31)
MCHC RBC AUTO-ENTMCNC: 33 G/DL (ref 33–37)
MCV RBC AUTO: 94.3 FL (ref 80–94)
MONOCYTES # BLD AUTO: 0.8 K/UL (ref 0.8–1)
MONOCYTES NFR BLD AUTO: 6.8 % (ref 1.7–9.3)
NEUTROPHILS # BLD AUTO: 9.6 K/UL (ref 1.8–7.7)
NEUTROPHILS NFR BLD AUTO: 81.5 % (ref 42.2–75.2)
NITRITE UR QL STRIP: NEGATIVE
PH UR STRIP: 8.5 [PH] (ref 5–9)
PHOSPHATE SERPL-MCNC: 1.4 MG/DL (ref 2.5–4.9)
PLATELET # BLD AUTO: 129 K/UL (ref 140–450)
POTASSIUM SERPL-SCNC: 3.2 MMOL/L (ref 3.5–5.1)
PROTHROMBIN TIME: 13.7 SECS (ref 10.8–13.4)
RBC # BLD AUTO: 3.63 MIL/UL (ref 4.2–6.1)
SODIUM SERPL-SCNC: 142 MMOL/L (ref 136–145)
T4 FREE SERPL-MCNC: 0.8 NG/DL (ref 0.76–1.46)
TIBC SERPL-MCNC: 249 UG/DL (ref 250–450)
TRIGL SERPL-MCNC: 58 MG/DL (ref 30–150)
TSH SERPL DL<=0.05 MIU/L-ACNC: 1.7 UIU/ML (ref 0.34–3.74)
WBC # BLD AUTO: 11.8 K/UL (ref 4.8–10.8)

## 2018-05-03 PROCEDURE — C1758 CATHETER, URETERAL: HCPCS

## 2018-05-03 RX ADMIN — SODIUM CHLORIDE SCH MLS/HR: 9 INJECTION, SOLUTION INTRAVENOUS at 23:00

## 2018-05-03 NOTE — NUR
Patient will be admitted to care of DR NI. Admited to TELEMETRY 124A. 
Belongings list completed.  Report to LYNETTE WEBER.

## 2018-05-03 NOTE — NUR
ADMITTED A 67M FROM ER. CAME BY ANDRIY . BEDSIDE REPORT TAKEN FROM ER NURSE. ON O2 BY MASK , 
O2 SAT % . NO SOB NOTED. WITH OCCASIONAL  NON PRODUCTIVE COUGH. PT IS AWAKE ,BUT DEAF. 
PROVIDED PAPER AND PENCIL AT BEDSIDE TO ASSIST IN COMMUNICATION WITH PT. POSITIONED FOR 
COMFORT. PT DIAPER FROM ER, ALL SOAKED WITH URINE. CLEANED AND KEPT DRY.  SKIN ASSESSED. 
WITH BRUISED ON THE LT SIDE OF UPPER THIGH . ABRASIONS ON LT SIDE OF THE EYE, LT UPPER SIDE 
OF EYEBROW. MAIN FEET SKIN DRY. HAS IVF INFUSING WELL ON THE LT AC #20. CLEAR AND PATENT. BED 
IN LOWEST POSITION, SIDE RAILS UP X2. CALL LIGHT PLACED WITHIN EASY REACH. WILL FOLLOW UP 
ADMIT ORDERS. WILL CONTINUE TO MONITOR.

## 2018-05-04 VITALS — DIASTOLIC BLOOD PRESSURE: 83 MMHG | SYSTOLIC BLOOD PRESSURE: 158 MMHG

## 2018-05-04 VITALS — SYSTOLIC BLOOD PRESSURE: 145 MMHG | DIASTOLIC BLOOD PRESSURE: 95 MMHG

## 2018-05-04 VITALS — DIASTOLIC BLOOD PRESSURE: 81 MMHG | SYSTOLIC BLOOD PRESSURE: 158 MMHG

## 2018-05-04 VITALS — DIASTOLIC BLOOD PRESSURE: 85 MMHG | SYSTOLIC BLOOD PRESSURE: 123 MMHG

## 2018-05-04 VITALS — SYSTOLIC BLOOD PRESSURE: 125 MMHG | DIASTOLIC BLOOD PRESSURE: 77 MMHG

## 2018-05-04 VITALS — DIASTOLIC BLOOD PRESSURE: 81 MMHG | SYSTOLIC BLOOD PRESSURE: 138 MMHG

## 2018-05-04 LAB
ANION GAP SERPL CALCULATED.3IONS-SCNC: 12 MMOL/L (ref 8–16)
BASOPHILS # BLD AUTO: 0.2 K/UL (ref 0–0.22)
BASOPHILS NFR BLD AUTO: 1.4 % (ref 0–2)
BUN SERPL-MCNC: 12 MG/DL (ref 7–18)
CHLORIDE SERPL-SCNC: 107 MMOL/L (ref 98–107)
CO2 SERPL-SCNC: 25.6 MMOL/L (ref 21–32)
CREAT SERPL-MCNC: 1.2 MG/DL (ref 0.7–1.3)
EOSINOPHIL # BLD AUTO: 0 K/UL (ref 0–0.4)
EOSINOPHIL NFR BLD AUTO: 0.2 % (ref 0–4)
ERYTHROCYTE [DISTWIDTH] IN BLOOD BY AUTOMATED COUNT: 12.9 % (ref 11.6–13.7)
GFR SERPL CREATININE-BSD FRML MDRD: 78 ML/MIN (ref 90–?)
GLUCOSE SERPL-MCNC: 137 MG/DL (ref 74–106)
HCT VFR BLD AUTO: 35.7 % (ref 36–52)
HGB BLD-MCNC: 12.1 G/DL (ref 12–18)
LYMPHOCYTES # BLD AUTO: 2.8 K/UL (ref 2–11.5)
LYMPHOCYTES NFR BLD AUTO: 19.4 % (ref 20.5–51.1)
MAGNESIUM SERPL-MCNC: 1.7 MG/DL (ref 1.8–2.4)
MCH RBC QN AUTO: 32 PG (ref 27–31)
MCHC RBC AUTO-ENTMCNC: 34 G/DL (ref 33–37)
MCV RBC AUTO: 95.5 FL (ref 80–94)
MONOCYTES # BLD AUTO: 0.9 K/UL (ref 0.8–1)
MONOCYTES NFR BLD AUTO: 6.1 % (ref 1.7–9.3)
NEUTROPHILS # BLD AUTO: 10.4 K/UL (ref 1.8–7.7)
NEUTROPHILS NFR BLD AUTO: 72.9 % (ref 42.2–75.2)
PHOSPHATE SERPL-MCNC: 1.7 MG/DL (ref 2.5–4.9)
PLATELET # BLD AUTO: 125 K/UL (ref 140–450)
POTASSIUM SERPL-SCNC: 3.6 MMOL/L (ref 3.5–5.1)
RBC # BLD AUTO: 3.74 MIL/UL (ref 4.2–6.1)
SODIUM SERPL-SCNC: 141 MMOL/L (ref 136–145)
WBC # BLD AUTO: 14.3 K/UL (ref 4.8–10.8)

## 2018-05-04 RX ADMIN — GABAPENTIN SCH MG: 300 CAPSULE ORAL at 17:00

## 2018-05-04 RX ADMIN — DEXTROSE SCH MLS/HR: 50 INJECTION, SOLUTION INTRAVENOUS at 12:31

## 2018-05-04 RX ADMIN — GABAPENTIN SCH MG: 300 CAPSULE ORAL at 12:30

## 2018-05-04 RX ADMIN — BENAZEPRIL HYDROCHLORIDE SCH MG: 20 TABLET, FILM COATED ORAL at 09:29

## 2018-05-04 RX ADMIN — SODIUM CHLORIDE SCH MLS/HR: 9 INJECTION, SOLUTION INTRAVENOUS at 08:37

## 2018-05-04 RX ADMIN — DEXTROSE SCH MLS/HR: 50 INJECTION, SOLUTION INTRAVENOUS at 04:16

## 2018-05-04 RX ADMIN — BUDESONIDE SCH MG: 0.25 SUSPENSION RESPIRATORY (INHALATION) at 20:00

## 2018-05-04 RX ADMIN — SODIUM CHLORIDE PRN MG: 9 INJECTION, SOLUTION INTRAVENOUS at 20:43

## 2018-05-04 RX ADMIN — DEXTROSE SCH MLS/HR: 50 INJECTION, SOLUTION INTRAVENOUS at 20:43

## 2018-05-04 RX ADMIN — PANTOPRAZOLE SODIUM SCH MG: 40 TABLET, DELAYED RELEASE ORAL at 06:15

## 2018-05-04 RX ADMIN — BENAZEPRIL HYDROCHLORIDE SCH MG: 20 TABLET, FILM COATED ORAL at 20:42

## 2018-05-04 RX ADMIN — BUDESONIDE SCH MG: 0.25 SUSPENSION RESPIRATORY (INHALATION) at 07:05

## 2018-05-04 RX ADMIN — SODIUM CHLORIDE SCH MLS/HR: 9 INJECTION, SOLUTION INTRAVENOUS at 17:07

## 2018-05-04 RX ADMIN — Medication SCH MG: at 09:29

## 2018-05-04 RX ADMIN — Medication SCH EACH: at 09:28

## 2018-05-04 RX ADMIN — SIMVASTATIN SCH MG: 20 TABLET, FILM COATED ORAL at 20:43

## 2018-05-04 RX ADMIN — GABAPENTIN SCH MG: 300 CAPSULE ORAL at 09:28

## 2018-05-04 NOTE — NUR
UNABLE TO OBTAIN SPUTUM AT THIS TIME, PT ATTEMPTED BUT IS NON PRODUCTIVE AT THIS TIME, PT 
WAS INSTRUCTED, ENCOURAGED, AND WILL TRY AGAIN LATER, MICHAEL OJEDA NOTIFIED.

## 2018-05-04 NOTE — NUR
RECEIVED REPORT FROM DAYSHIFT NURSE AT BEDSIDE FOR CONTINUITY OF CARE. PT IS AAOX4. NO SOB. 
NO S/S OF DISTRESS. PT IS ON O2 NC 3L. IV NOTED LAC 20G NS 150ML/HR. NO PAIN AT THIS TIME. 
BED LOWERED CALL LIGHT WITHIN REACH WILL CONTINUE TO MONITOR.

## 2018-05-04 NOTE — NUR
PT IS BEING CLEANED BY SNEHA WINTERS. PT PEOPLE, BINH AND JUSTINE ON BEDSIDE AND ABOUT TO START 
WITH THE EXERCISE WITH THE PT. NO SIGN OF DISTRESS NOTED.

## 2018-05-04 NOTE — NUR
RECEIVED CALL FROM ALEC AT [facility] [phone] PT IS CURRENTLY ON THEIR SERVICE.

## 2018-05-04 NOTE — NUR
PT IS AWAKE AND VITAL SIGNS TAKEN, NO SIGN OF DISTRESS NOTED. CALL LIGHT WITHIN REACH, WILL 
MONITOR.

## 2018-05-04 NOTE — NUR
RECIVED PT FROM NIGHT SHIFT NURSE MICHAEL, PT IS ASLEEP ON THE BED WITH A LEFT AC AND FLUID OF 
NS RUNNING AT 150ML.HR. RT IS ON THE BEDSIDE TO DO A BREATHING TREATMENT. O2 SAT IS AT 98. 
SIDE RAILS UP AND CALL LIGHT WITHIN REACH. NO SIGN OF DISTRESS NOTED. WILL MONITOR.

## 2018-05-04 NOTE — NUR
PT TOLERATED  PO MEDS GIVEN THIS AM . NO PROBLEM NOTED.  NO DISTRESS  DURING THE NIGHT. O2 
SAT 90% ON O23L/NC.

## 2018-05-04 NOTE — NUR
PATIENT HAS BEEN SCREENED AND CATEGORIZED AS MODERATE NUTRITION RISK. PATIENT WILL BE SEEN 
WITHIN 3-5 DAYS OF ADMISSION.



5/6/18 5/8/18



KAREEM MOJICA RD

## 2018-05-05 VITALS — DIASTOLIC BLOOD PRESSURE: 88 MMHG | SYSTOLIC BLOOD PRESSURE: 131 MMHG

## 2018-05-05 VITALS — SYSTOLIC BLOOD PRESSURE: 133 MMHG | DIASTOLIC BLOOD PRESSURE: 81 MMHG

## 2018-05-05 VITALS — SYSTOLIC BLOOD PRESSURE: 126 MMHG | DIASTOLIC BLOOD PRESSURE: 72 MMHG

## 2018-05-05 VITALS — SYSTOLIC BLOOD PRESSURE: 144 MMHG | DIASTOLIC BLOOD PRESSURE: 85 MMHG

## 2018-05-05 VITALS — DIASTOLIC BLOOD PRESSURE: 89 MMHG | SYSTOLIC BLOOD PRESSURE: 143 MMHG

## 2018-05-05 LAB
ANION GAP SERPL CALCULATED.3IONS-SCNC: 11.5 MMOL/L (ref 8–16)
BASOPHILS # BLD AUTO: 0 K/UL (ref 0–0.22)
BASOPHILS NFR BLD AUTO: 0.2 % (ref 0–2)
BUN SERPL-MCNC: 12 MG/DL (ref 7–18)
CHLORIDE SERPL-SCNC: 111 MMOL/L (ref 98–107)
CO2 SERPL-SCNC: 25.9 MMOL/L (ref 21–32)
CREAT SERPL-MCNC: 1.2 MG/DL (ref 0.7–1.3)
EOSINOPHIL # BLD AUTO: 0.2 K/UL (ref 0–0.4)
EOSINOPHIL NFR BLD AUTO: 1.5 % (ref 0–4)
ERYTHROCYTE [DISTWIDTH] IN BLOOD BY AUTOMATED COUNT: 13.6 % (ref 11.6–13.7)
GFR SERPL CREATININE-BSD FRML MDRD: 78 ML/MIN (ref 90–?)
GLUCOSE SERPL-MCNC: 103 MG/DL (ref 74–106)
HCT VFR BLD AUTO: 34 % (ref 36–52)
HGB BLD-MCNC: 11.5 G/DL (ref 12–18)
LYMPHOCYTES # BLD AUTO: 3.4 K/UL (ref 2–11.5)
LYMPHOCYTES NFR BLD AUTO: 28.7 % (ref 20.5–51.1)
MAGNESIUM SERPL-MCNC: 1.9 MG/DL (ref 1.8–2.4)
MCH RBC QN AUTO: 32 PG (ref 27–31)
MCHC RBC AUTO-ENTMCNC: 34 G/DL (ref 33–37)
MCV RBC AUTO: 95.2 FL (ref 80–94)
MONOCYTES # BLD AUTO: 0.9 K/UL (ref 0.8–1)
MONOCYTES NFR BLD AUTO: 8 % (ref 1.7–9.3)
NEUTROPHILS # BLD AUTO: 7.2 K/UL (ref 1.8–7.7)
NEUTROPHILS NFR BLD AUTO: 61.6 % (ref 42.2–75.2)
PHOSPHATE SERPL-MCNC: 2.4 MG/DL (ref 2.5–4.9)
PLATELET # BLD AUTO: 140 K/UL (ref 140–450)
POTASSIUM SERPL-SCNC: 3.4 MMOL/L (ref 3.5–5.1)
RBC # BLD AUTO: 3.57 MIL/UL (ref 4.2–6.1)
SODIUM SERPL-SCNC: 145 MMOL/L (ref 136–145)
T3RU NFR SERPL: 27 % (ref 24–39)
T4 SERPL-MCNC: 7 UG/DL (ref 4.5–12)
WBC # BLD AUTO: 11.7 K/UL (ref 4.8–10.8)

## 2018-05-05 RX ADMIN — GABAPENTIN SCH MG: 300 CAPSULE ORAL at 13:53

## 2018-05-05 RX ADMIN — SODIUM CHLORIDE SCH MLS/HR: 9 INJECTION, SOLUTION INTRAVENOUS at 23:57

## 2018-05-05 RX ADMIN — GABAPENTIN SCH MG: 300 CAPSULE ORAL at 17:25

## 2018-05-05 RX ADMIN — DEXTROSE SCH MLS/HR: 50 INJECTION, SOLUTION INTRAVENOUS at 13:53

## 2018-05-05 RX ADMIN — DEXTROSE SCH MLS/HR: 50 INJECTION, SOLUTION INTRAVENOUS at 04:50

## 2018-05-05 RX ADMIN — BUDESONIDE SCH MG: 0.25 SUSPENSION RESPIRATORY (INHALATION) at 07:30

## 2018-05-05 RX ADMIN — DEXTROSE SCH MLS/HR: 50 INJECTION, SOLUTION INTRAVENOUS at 20:14

## 2018-05-05 RX ADMIN — BUDESONIDE SCH MG: 0.25 SUSPENSION RESPIRATORY (INHALATION) at 19:32

## 2018-05-05 RX ADMIN — BENAZEPRIL HYDROCHLORIDE SCH MG: 20 TABLET, FILM COATED ORAL at 09:54

## 2018-05-05 RX ADMIN — SODIUM CHLORIDE SCH MLS/HR: 9 INJECTION, SOLUTION INTRAVENOUS at 04:49

## 2018-05-05 RX ADMIN — SIMVASTATIN SCH MG: 20 TABLET, FILM COATED ORAL at 20:13

## 2018-05-05 RX ADMIN — Medication SCH EACH: at 11:59

## 2018-05-05 RX ADMIN — GABAPENTIN SCH MG: 300 CAPSULE ORAL at 09:52

## 2018-05-05 RX ADMIN — BENAZEPRIL HYDROCHLORIDE SCH MG: 20 TABLET, FILM COATED ORAL at 20:13

## 2018-05-05 RX ADMIN — SODIUM CHLORIDE SCH MLS/HR: 9 INJECTION, SOLUTION INTRAVENOUS at 17:25

## 2018-05-05 RX ADMIN — Medication SCH MG: at 09:52

## 2018-05-05 RX ADMIN — SODIUM CHLORIDE PRN MG: 9 INJECTION, SOLUTION INTRAVENOUS at 14:23

## 2018-05-05 RX ADMIN — PANTOPRAZOLE SODIUM SCH MG: 40 TABLET, DELAYED RELEASE ORAL at 06:01

## 2018-05-05 NOTE — NUR
PT IS AWAKE AND ALERT. EATING WITH CNA. DOES NOT WANT TO TAKE HHN TX. PT IS ON ROOM AIR WITH 
SPO2 OF 92% NO SOB OR DISTRESS NOTED. WILL CONTINUE TO MONITOR.

## 2018-05-05 NOTE — NUR
SPOKE WITH DR. LEWIS REGARDING PT'S DESIRE TO HAVE HIS DIET ADVANCED TO SOFT. PT ABLE TO 
SWALLOW ALL HIS PILLS AS A WHOLE. NO DIFFICULTY OF SWALLOWING NOTED. NEW ORDER GIVEN, PT'S 
DIET ADVANCED TO MECHANICAL SOFT.

## 2018-05-05 NOTE — NUR
PT AWAKE. NO SOB NOTED. NO COMPLAINTS OF PAIN AT THIS TIME.  ENDORSED TO NEXT SHIFT NURSE 
FOR CONTINUITY OF CARE.

## 2018-05-05 NOTE — NUR
RECEIVED PT ON BED AAOX3. NO SOB NOTED. NO C/O PAIN AT THIS TIME. IV TO LT AC PATENT AND 
INTACT. CHEST, DIMINISHED AIR ENTRY TO THE BASES. ABDOMEN SOFT, BOWEL SOUNDS PRESENT. NO 
EDEMA NOTED. INSTRUCTED PT TO CALL FOR ASSISTANCE, CALL LIGHT WITHIN REACH, PT VERBALIZED 
UNDERSTANDING.

## 2018-05-05 NOTE — NUR
RECEIVED PT AWAKE WATCHING TV, PT DEAF BUT ABLE TO READ LIPS AND WRITE ON A NOTEPAD, VITAL 
SIGNS STABLE, 96% ON ROOM AIR, NO SIGNS OF SOB NOTED, IVF INFUSING WELL, SAFETY MEASURES IN 
PLACE, SIDE RAILS UP AND BED ALARM ON, CALL LIGHT WITHIN REACH.

## 2018-05-05 NOTE — NUR
PT AWAKE WATCHING TV, VITAL SIGNS STABLE, DENIES PAIN, NO SOB NOTED, IVF INFUSING WELL, 
CONTINUE TO MONITOR CLOSELY.

## 2018-05-05 NOTE — NUR
***** PHYSICAL THERAPY CO-SIGN *****

The Physical Therapy Progress Notes documented by Physical Therapy Assistant have been 
reviewed.



Reviewed/Co-Signed by: Casi Nelson PT

Documentation Done by:REJI CARREON PTA



5/4/18 XR L SHOULDER:(-)FX

Pt EDUC ON FALL PREVENTION, REINFORCE USE OF CALL LT FOR NURSE ASST, NEEDS REINFORCEMENT

POC REVIEWED W/ PTA; PROGRESSING W/ GAIT ENDURANCE; WILL BENEFIT W/ P.T. AFTER ACUTE STAY.

-------------------------------------------------------------------------------

Addendum: 05/05/18 at 1302 by Casi Nelson PT

-------------------------------------------------------------------------------

Amended: Links added.

## 2018-05-05 NOTE — NUR
TELE DISCONTINUED AS ORDERED, PT WITH EPISODE OF INCONTINENCE, PERINEAL CARE DONE, 
REPOSITION Q2H AND OFFLOAD PRESSURES AREAS, ALL NEEDS ATTENDED.

## 2018-05-06 VITALS — SYSTOLIC BLOOD PRESSURE: 153 MMHG | DIASTOLIC BLOOD PRESSURE: 78 MMHG

## 2018-05-06 VITALS — DIASTOLIC BLOOD PRESSURE: 99 MMHG | SYSTOLIC BLOOD PRESSURE: 169 MMHG

## 2018-05-06 VITALS — DIASTOLIC BLOOD PRESSURE: 73 MMHG | SYSTOLIC BLOOD PRESSURE: 135 MMHG

## 2018-05-06 LAB
ANION GAP SERPL CALCULATED.3IONS-SCNC: 13.1 MMOL/L (ref 8–16)
BASOPHILS # BLD AUTO: 0 K/UL (ref 0–0.22)
BASOPHILS NFR BLD AUTO: 0.3 % (ref 0–2)
BUN SERPL-MCNC: 11 MG/DL (ref 7–18)
CHLORIDE SERPL-SCNC: 110 MMOL/L (ref 98–107)
CO2 SERPL-SCNC: 24 MMOL/L (ref 21–32)
CREAT SERPL-MCNC: 1.2 MG/DL (ref 0.7–1.3)
EOSINOPHIL # BLD AUTO: 0.5 K/UL (ref 0–0.4)
EOSINOPHIL NFR BLD AUTO: 4.6 % (ref 0–4)
ERYTHROCYTE [DISTWIDTH] IN BLOOD BY AUTOMATED COUNT: 13.5 % (ref 11.6–13.7)
GFR SERPL CREATININE-BSD FRML MDRD: 78 ML/MIN (ref 90–?)
GLUCOSE SERPL-MCNC: 92 MG/DL (ref 74–106)
HCT VFR BLD AUTO: 33.2 % (ref 36–52)
HGB BLD-MCNC: 11.1 G/DL (ref 12–18)
LYMPHOCYTES # BLD AUTO: 3 K/UL (ref 2–11.5)
LYMPHOCYTES NFR BLD AUTO: 29.8 % (ref 20.5–51.1)
MCH RBC QN AUTO: 32 PG (ref 27–31)
MCHC RBC AUTO-ENTMCNC: 33 G/DL (ref 33–37)
MCV RBC AUTO: 95.1 FL (ref 80–94)
MONOCYTES # BLD AUTO: 0.8 K/UL (ref 0.8–1)
MONOCYTES NFR BLD AUTO: 7.6 % (ref 1.7–9.3)
NEUTROPHILS # BLD AUTO: 5.8 K/UL (ref 1.8–7.7)
NEUTROPHILS NFR BLD AUTO: 57.7 % (ref 42.2–75.2)
PLATELET # BLD AUTO: 153 K/UL (ref 140–450)
POTASSIUM SERPL-SCNC: 3.1 MMOL/L (ref 3.5–5.1)
RBC # BLD AUTO: 3.49 MIL/UL (ref 4.2–6.1)
SODIUM SERPL-SCNC: 144 MMOL/L (ref 136–145)
WBC # BLD AUTO: 10 K/UL (ref 4.8–10.8)

## 2018-05-06 RX ADMIN — Medication SCH EACH: at 08:58

## 2018-05-06 RX ADMIN — GABAPENTIN SCH MG: 300 CAPSULE ORAL at 16:10

## 2018-05-06 RX ADMIN — DEXTROSE SCH MLS/HR: 50 INJECTION, SOLUTION INTRAVENOUS at 12:25

## 2018-05-06 RX ADMIN — SODIUM CHLORIDE SCH MLS/HR: 9 INJECTION, SOLUTION INTRAVENOUS at 08:59

## 2018-05-06 RX ADMIN — SODIUM CHLORIDE SCH MLS/HR: 9 INJECTION, SOLUTION INTRAVENOUS at 06:10

## 2018-05-06 RX ADMIN — BUDESONIDE SCH MG: 0.25 SUSPENSION RESPIRATORY (INHALATION) at 08:06

## 2018-05-06 RX ADMIN — BENAZEPRIL HYDROCHLORIDE SCH MG: 20 TABLET, FILM COATED ORAL at 08:58

## 2018-05-06 RX ADMIN — GABAPENTIN SCH MG: 300 CAPSULE ORAL at 12:24

## 2018-05-06 RX ADMIN — Medication SCH MG: at 08:59

## 2018-05-06 RX ADMIN — PANTOPRAZOLE SODIUM SCH MG: 40 TABLET, DELAYED RELEASE ORAL at 06:09

## 2018-05-06 RX ADMIN — GABAPENTIN SCH MG: 300 CAPSULE ORAL at 08:57

## 2018-05-06 RX ADMIN — DEXTROSE SCH MLS/HR: 50 INJECTION, SOLUTION INTRAVENOUS at 04:29

## 2018-05-06 NOTE — NUR
PT AWAKE REQUESTING FOR SLEEPING PILL, DR BARBOSA WITH NEW ORDER, MEDICATED WITH AMBIEN, 
MONITORED CLOSELY, SIDE RAILS UP AND BED ALARM ON.

## 2018-05-06 NOTE — NUR
PT WHEEL CHAIRED OUT TO THE LOBBY TO THE CAR. PERSONAL BELONGINGS WITH PT. ACCOMPANIED BY 
WIFE. PT IN STABLE CONDITION.

## 2018-05-06 NOTE — NUR
PT DISORIENTED, REORIENTED TO PLACE AND TIME, INCONTINENT CARE DONE, REPOSITIONED AND 
OFFLOAD PRESSURE AREAS, MONITORED CLOSELY.

## 2018-05-06 NOTE — NUR
RECEIVED REPORT FROM PM NURSE AT BEDSIDE FOR CONTINUITY OF CARE. PT SLEEPING AT THIS TIME. 
UPDATED THE PT BOARD. WILL CONTINUE TO MONITOR PT.

## 2018-05-06 NOTE — NUR
ADMINISTERED GABAPENTIN AND ZOSYN TO PT. P[T TOLERATED WELL. NO SIGN OF DISTRESS. PT RESTING 
COMFORTABLY.PER CNA, PT REFUSED LUNCH. PT JUST WANT TO SLEEP. ALL SAFETY MEASURES IN PLACE. 
WILL CONTINUE TO MONITOR PT.

## 2018-05-06 NOTE — NUR
GAVE PT AND WIFE D/C INSTRUCTIONS. WIFE VERBALIZED UNDERSTANDING. REMOVED PT'S IV. CANNULA 
INTACT. NO BLEEDING NOTED. WIFE FORGOT CLOTHES. REQUESTED CLOTHES FROM SECURITY. REMOVED ALL 
ARM BANDS. ASSISTED WITH DIAPER AND CLOTHES. ONCE HE IS READY TO GO, THEY WILL LET US KNOW. 
WILL GET WHEELCHAIR READY.

## 2018-05-06 NOTE — NUR
PT EASILY AROUSABLE, NO DISTRESS NOTED, DUE PO MEDICATION TAKEN, IV ANTIBIOTIC INFUSING 
WELL, SIDE RAILS UP AND BED ALARM ON, MONITORED CLOSELY.

## 2018-05-08 NOTE — NUR
Social Service Note:



I faxed H&P, list of medication, labs, and face sheet to [facility], phone number 
[phone], fax [phone].

## 2021-06-19 NOTE — NUR
NEW IV LINE INSERTED ON LEFT FOREARM BY NURSE LAUREN. 22G, PATENT AND FLUSHING WELL. RIGHT 
FOREARM IV WAS REMOVED AS IT WAS NO LONGER FLUSHING WITH SALINE FLUSH; CATHETER WAS INTACT. Pt in bed resting quietly at this time. Pt daughter at bedside. Pt not complaining of any pain. EKG completed at this time. Nothing needed.